# Patient Record
Sex: MALE | Race: WHITE | URBAN - METROPOLITAN AREA
[De-identification: names, ages, dates, MRNs, and addresses within clinical notes are randomized per-mention and may not be internally consistent; named-entity substitution may affect disease eponyms.]

---

## 2019-02-15 ENCOUNTER — IMPORTED ENCOUNTER (OUTPATIENT)
Dept: URBAN - METROPOLITAN AREA CLINIC 50 | Facility: CLINIC | Age: 74
End: 2019-02-15

## 2021-04-17 ASSESSMENT — VISUAL ACUITY
OD_BAT: 20/100
OD_SC: 20/50
OS_BAT: 20/100
OD_OTHER: 20/100.
OS_OTHER: 20/100.
OS_SC: 20/70

## 2021-04-17 ASSESSMENT — TONOMETRY
OD_IOP_MMHG: 12
OS_IOP_MMHG: 12

## 2023-10-09 NOTE — PATIENT DISCUSSION
"""Informed patient that their cataract is visually significant and meets the criteria for cataract "" Medication: sertraline (ZOLOFT) 100 MG tablet  Last office visit date: 09/08/2023  Next appointment scheduled?: Yes , 03/08/2024  Number of refills given: 0, current refills on file at pharmacy.    Medication: lisinopril (ZESTRIL) 20 MG tablet  Medication Refill Protocol Failed.  Failed criteria: see below. Sent to clinician to review.    ACE Inhibitor Refill Protocol - 12 Month Protocol Failed 10/09/2023 09:01 AM   Protocol Details  Normal eGFR within last 12 months looking at last value